# Patient Record
Sex: FEMALE | Race: WHITE | NOT HISPANIC OR LATINO | Employment: FULL TIME | URBAN - METROPOLITAN AREA
[De-identification: names, ages, dates, MRNs, and addresses within clinical notes are randomized per-mention and may not be internally consistent; named-entity substitution may affect disease eponyms.]

---

## 2019-11-05 ENCOUNTER — OFFICE VISIT (OUTPATIENT)
Dept: INTERNAL MEDICINE | Facility: CLINIC | Age: 24
End: 2019-11-05
Payer: COMMERCIAL

## 2019-11-05 VITALS
WEIGHT: 158.5 LBS | SYSTOLIC BLOOD PRESSURE: 100 MMHG | DIASTOLIC BLOOD PRESSURE: 80 MMHG | HEART RATE: 80 BPM | HEIGHT: 65 IN | BODY MASS INDEX: 26.41 KG/M2 | TEMPERATURE: 98 F

## 2019-11-05 DIAGNOSIS — H69.93 EUSTACHIAN TUBE DYSFUNCTION, BILATERAL: Primary | ICD-10-CM

## 2019-11-05 PROCEDURE — 99999 PR PBB SHADOW E&M-NEW PATIENT-LVL III: CPT | Mod: PBBFAC,,, | Performed by: NURSE PRACTITIONER

## 2019-11-05 PROCEDURE — 99214 OFFICE O/P EST MOD 30 MIN: CPT | Mod: S$GLB,,, | Performed by: NURSE PRACTITIONER

## 2019-11-05 PROCEDURE — 99999 PR PBB SHADOW E&M-NEW PATIENT-LVL III: ICD-10-PCS | Mod: PBBFAC,,, | Performed by: NURSE PRACTITIONER

## 2019-11-05 PROCEDURE — 99214 PR OFFICE/OUTPT VISIT, EST, LEVL IV, 30-39 MIN: ICD-10-PCS | Mod: S$GLB,,, | Performed by: NURSE PRACTITIONER

## 2019-11-05 PROCEDURE — 3008F BODY MASS INDEX DOCD: CPT | Mod: CPTII,S$GLB,, | Performed by: NURSE PRACTITIONER

## 2019-11-05 PROCEDURE — 3008F PR BODY MASS INDEX (BMI) DOCUMENTED: ICD-10-PCS | Mod: CPTII,S$GLB,, | Performed by: NURSE PRACTITIONER

## 2019-11-05 RX ORDER — FLUTICASONE PROPIONATE 50 MCG
2 SPRAY, SUSPENSION (ML) NASAL DAILY
Qty: 1 BOTTLE | Refills: 0 | Status: SHIPPED | OUTPATIENT
Start: 2019-11-05 | End: 2019-11-27 | Stop reason: SDUPTHER

## 2019-11-05 RX ORDER — PREDNISONE 20 MG/1
TABLET ORAL
Qty: 20 TABLET | Refills: 0 | Status: SHIPPED | OUTPATIENT
Start: 2019-11-05

## 2019-11-05 RX ORDER — AMOXICILLIN 500 MG/1
500 CAPSULE ORAL
COMMUNITY

## 2019-11-05 NOTE — PATIENT INSTRUCTIONS
Continue augmentin  Start prednisone taper tomorrow  Warm steamy soups and beverages  Zyrtec, sudafed, and flonase daily   Follow up with ent if not improving after steroid course

## 2019-11-05 NOTE — PROGRESS NOTES
"Subjective:       Patient ID: Janett Dunbar is a 23 y.o. female.    Chief Complaint: Otalgia    Patient had Upper Respiratory Infection on 10/26. She flew on an airplane on 10/28. She states that ears plugged up and became painful. She was seen at [facility] clinic and told she had BOM and tonsillitis. She was given augmentin and sudafed. All Upper Respiratory Infection symptoms have improved, however, she has continued plugged feeling to bilateral ears. Her hearing is slightly decreased. She feels that they will not pop back open since the airplane ride. No fever. She has 3 days left of augmentin.      /80   Pulse 80   Temp 98 °F (36.7 °C)   Ht 5' 5" (1.651 m)   Wt 71.9 kg (158 lb 8.2 oz)   BMI 26.38 kg/m²     Review of Systems   Constitutional: Positive for activity change. Negative for appetite change, chills, diaphoresis, fatigue, fever and unexpected weight change.   HENT: Positive for ear pain and hearing loss.    Respiratory: Negative for cough and shortness of breath.    Cardiovascular: Negative for chest pain, palpitations and leg swelling.   Gastrointestinal: Negative.    Genitourinary: Negative.    Musculoskeletal: Negative.    Skin: Negative for color change, pallor, rash and wound.   Allergic/Immunologic: Negative for immunocompromised state.   Neurological: Negative.  Negative for dizziness and facial asymmetry.   Hematological: Negative for adenopathy. Does not bruise/bleed easily.   Psychiatric/Behavioral: Negative for agitation, behavioral problems and confusion.       Objective:      Physical Exam   Constitutional: She is oriented to person, place, and time. She appears well-developed and well-nourished. No distress.   HENT:   Head: Normocephalic and atraumatic.   Right Ear: Tympanic membrane, external ear and ear canal normal.   Left Ear: Tympanic membrane, external ear and ear canal normal.   Nose: Nose normal.   Mouth/Throat: Uvula is midline. No oropharyngeal exudate, posterior " oropharyngeal edema or posterior oropharyngeal erythema.   Eyes: Conjunctivae are normal. Right eye exhibits no discharge. Left eye exhibits no discharge.   Cardiovascular: Normal rate, regular rhythm and normal heart sounds.   No murmur heard.  Pulmonary/Chest: Effort normal and breath sounds normal. No respiratory distress. She has no wheezes.   Abdominal: Soft. She exhibits no distension.   Musculoskeletal: Normal range of motion. She exhibits no edema or tenderness.   Neurological: She is alert and oriented to person, place, and time.   Skin: Skin is warm and dry. No rash noted. She is not diaphoretic. No erythema.   Psychiatric: She has a normal mood and affect. Her behavior is normal. Judgment and thought content normal.   Nursing note and vitals reviewed.      Assessment:       1. Eustachian tube dysfunction, bilateral        Plan:       Janett was seen today for otalgia.    Diagnoses and all orders for this visit:    Eustachian tube dysfunction, bilateral  -     predniSONE (DELTASONE) 20 MG tablet; 3 pills for 3 days, 2 pills for 3 days, 1 pill for 3 days, 1/2 pill for 3 days then stop.  -     fluticasone propionate (FLONASE) 50 mcg/actuation nasal spray; 2 sprays (100 mcg total) by Each Nostril route once daily.      Patient Instructions   Continue augmentin  Start prednisone taper tomorrow  Warm steamy soups and beverages  Zyrtec, sudafed, and flonase daily   Follow up with ent if not improving after steroid course

## 2019-11-27 DIAGNOSIS — H69.93 EUSTACHIAN TUBE DYSFUNCTION, BILATERAL: ICD-10-CM

## 2019-11-29 RX ORDER — FLUTICASONE PROPIONATE 50 MCG
2 SPRAY, SUSPENSION (ML) NASAL DAILY
Qty: 16 ML | Refills: 0 | Status: SHIPPED | OUTPATIENT
Start: 2019-11-29

## 2020-09-25 ENCOUNTER — PATIENT MESSAGE (OUTPATIENT)
Dept: ADMINISTRATIVE | Facility: OTHER | Age: 25
End: 2020-09-25

## 2020-09-25 ENCOUNTER — PATIENT MESSAGE (OUTPATIENT)
Dept: OTHER | Facility: OTHER | Age: 25
End: 2020-09-25

## 2020-09-28 DIAGNOSIS — Z13.79 GENETIC SCREENING: ICD-10-CM

## 2021-04-29 ENCOUNTER — PATIENT MESSAGE (OUTPATIENT)
Dept: RESEARCH | Facility: HOSPITAL | Age: 26
End: 2021-04-29

## 2021-11-16 PROBLEM — O26.899 RH NEGATIVE STATE IN ANTEPARTUM PERIOD (HCC): Status: ACTIVE | Noted: 2021-11-16

## 2021-11-16 PROBLEM — Z67.91 RH NEGATIVE STATE IN ANTEPARTUM PERIOD (HCC): Status: ACTIVE | Noted: 2021-11-16

## 2021-11-16 PROBLEM — Z67.91 RH NEGATIVE STATE IN ANTEPARTUM PERIOD: Status: ACTIVE | Noted: 2021-11-16

## 2021-11-16 PROBLEM — O26.899 RH NEGATIVE STATE IN ANTEPARTUM PERIOD: Status: ACTIVE | Noted: 2021-11-16

## 2021-12-17 PROBLEM — Z34.01 ENCOUNTER FOR SUPERVISION OF NORMAL FIRST PREGNANCY IN FIRST TRIMESTER (HCC): Status: ACTIVE | Noted: 2021-12-17

## 2021-12-17 PROBLEM — Z34.01 ENCOUNTER FOR SUPERVISION OF NORMAL FIRST PREGNANCY IN FIRST TRIMESTER: Status: ACTIVE | Noted: 2021-12-17

## 2022-06-11 LAB
STREPTOCOCCUS GROUP B PCR: NEGATIVE
STREPTOCOCCUS GROUP B PCR: NEGATIVE

## 2022-07-04 ENCOUNTER — ANESTHESIA (OUTPATIENT)
Dept: OBGYN UNIT | Facility: HOSPITAL | Age: 27
End: 2022-07-04
Payer: COMMERCIAL

## 2022-07-04 ENCOUNTER — HOSPITAL ENCOUNTER (INPATIENT)
Facility: HOSPITAL | Age: 27
LOS: 2 days | Discharge: HOME | End: 2022-07-06
Attending: OBSTETRICS & GYNECOLOGY | Admitting: OBSTETRICS & GYNECOLOGY
Payer: COMMERCIAL

## 2022-07-04 ENCOUNTER — ANESTHESIA EVENT (OUTPATIENT)
Dept: OBGYN UNIT | Facility: HOSPITAL | Age: 27
End: 2022-07-04
Payer: COMMERCIAL

## 2022-07-04 PROBLEM — Z34.90 PREGNANCY (HCC): Status: ACTIVE | Noted: 2022-07-04

## 2022-07-04 PROBLEM — Z34.90 PREGNANCY: Status: ACTIVE | Noted: 2022-07-04

## 2022-07-04 LAB
ANTIBODY SCREEN: NEGATIVE
BASOPHILS # BLD AUTO: 0.04 X10(3) UL (ref 0–0.2)
BASOPHILS NFR BLD AUTO: 0.3 %
EOSINOPHIL # BLD AUTO: 0.1 X10(3) UL (ref 0–0.7)
EOSINOPHIL NFR BLD AUTO: 0.8 %
ERYTHROCYTE [DISTWIDTH] IN BLOOD BY AUTOMATED COUNT: 13.3 %
HCT VFR BLD AUTO: 38.1 %
HGB BLD-MCNC: 12.6 G/DL
IMM GRANULOCYTES # BLD AUTO: 0.07 X10(3) UL (ref 0–1)
IMM GRANULOCYTES NFR BLD: 0.6 %
LYMPHOCYTES # BLD AUTO: 1.91 X10(3) UL (ref 1–4)
LYMPHOCYTES NFR BLD AUTO: 15.4 %
MCH RBC QN AUTO: 29.3 PG (ref 26–34)
MCHC RBC AUTO-ENTMCNC: 33.1 G/DL (ref 31–37)
MCV RBC AUTO: 88.6 FL
MONOCYTES # BLD AUTO: 1.19 X10(3) UL (ref 0.1–1)
MONOCYTES NFR BLD AUTO: 9.6 %
NEUTROPHILS # BLD AUTO: 9.1 X10 (3) UL (ref 1.5–7.7)
NEUTROPHILS # BLD AUTO: 9.1 X10(3) UL (ref 1.5–7.7)
NEUTROPHILS NFR BLD AUTO: 73.3 %
PLATELET # BLD AUTO: 240 10(3)UL (ref 150–450)
RBC # BLD AUTO: 4.3 X10(6)UL
RH BLOOD TYPE: NEGATIVE
SARS-COV-2 RNA RESP QL NAA+PROBE: NOT DETECTED
T PALLIDUM AB SER QL IA: NONREACTIVE
WBC # BLD AUTO: 12.4 X10(3) UL (ref 4–11)

## 2022-07-04 PROCEDURE — 86901 BLOOD TYPING SEROLOGIC RH(D): CPT | Performed by: OBSTETRICS & GYNECOLOGY

## 2022-07-04 PROCEDURE — 86780 TREPONEMA PALLIDUM: CPT | Performed by: OBSTETRICS & GYNECOLOGY

## 2022-07-04 PROCEDURE — 86850 RBC ANTIBODY SCREEN: CPT | Performed by: OBSTETRICS & GYNECOLOGY

## 2022-07-04 PROCEDURE — 99204 OFFICE O/P NEW MOD 45 MIN: CPT

## 2022-07-04 PROCEDURE — 86900 BLOOD TYPING SEROLOGIC ABO: CPT | Performed by: OBSTETRICS & GYNECOLOGY

## 2022-07-04 PROCEDURE — 85025 COMPLETE CBC W/AUTO DIFF WBC: CPT | Performed by: OBSTETRICS & GYNECOLOGY

## 2022-07-04 RX ORDER — NALBUPHINE HCL 10 MG/ML
2.5 AMPUL (ML) INJECTION
Status: DISCONTINUED | OUTPATIENT
Start: 2022-07-04 | End: 2022-07-05

## 2022-07-04 RX ORDER — TERBUTALINE SULFATE 1 MG/ML
0.25 INJECTION, SOLUTION SUBCUTANEOUS AS NEEDED
Status: DISCONTINUED | OUTPATIENT
Start: 2022-07-04 | End: 2022-07-05

## 2022-07-04 RX ORDER — BUPIVACAINE HCL/0.9 % NACL/PF 0.25 %
5 PLASTIC BAG, INJECTION (ML) EPIDURAL AS NEEDED
Status: DISCONTINUED | OUTPATIENT
Start: 2022-07-04 | End: 2022-07-05

## 2022-07-04 RX ORDER — ONDANSETRON 2 MG/ML
4 INJECTION INTRAMUSCULAR; INTRAVENOUS EVERY 6 HOURS PRN
Status: DISCONTINUED | OUTPATIENT
Start: 2022-07-04 | End: 2022-07-05

## 2022-07-04 RX ORDER — SODIUM CHLORIDE, SODIUM LACTATE, POTASSIUM CHLORIDE, CALCIUM CHLORIDE 600; 310; 30; 20 MG/100ML; MG/100ML; MG/100ML; MG/100ML
INJECTION, SOLUTION INTRAVENOUS CONTINUOUS
Status: DISCONTINUED | OUTPATIENT
Start: 2022-07-04 | End: 2022-07-05

## 2022-07-04 RX ORDER — TRISODIUM CITRATE DIHYDRATE AND CITRIC ACID MONOHYDRATE 500; 334 MG/5ML; MG/5ML
30 SOLUTION ORAL AS NEEDED
Status: DISCONTINUED | OUTPATIENT
Start: 2022-07-04 | End: 2022-07-05

## 2022-07-04 RX ORDER — DEXTROSE, SODIUM CHLORIDE, SODIUM LACTATE, POTASSIUM CHLORIDE, AND CALCIUM CHLORIDE 5; .6; .31; .03; .02 G/100ML; G/100ML; G/100ML; G/100ML; G/100ML
INJECTION, SOLUTION INTRAVENOUS AS NEEDED
Status: DISCONTINUED | OUTPATIENT
Start: 2022-07-04 | End: 2022-07-05

## 2022-07-04 RX ORDER — AMMONIA INHALANTS 0.04 G/.3ML
0.3 INHALANT RESPIRATORY (INHALATION) AS NEEDED
Status: DISCONTINUED | OUTPATIENT
Start: 2022-07-04 | End: 2022-07-05

## 2022-07-04 RX ORDER — IBUPROFEN 600 MG/1
600 TABLET ORAL EVERY 6 HOURS PRN
Status: DISCONTINUED | OUTPATIENT
Start: 2022-07-04 | End: 2022-07-05

## 2022-07-04 RX ORDER — ACETAMINOPHEN 500 MG
500 TABLET ORAL EVERY 6 HOURS PRN
Status: DISCONTINUED | OUTPATIENT
Start: 2022-07-04 | End: 2022-07-05

## 2022-07-05 LAB — FETAL SCREEN RESULT: NEGATIVE

## 2022-07-05 PROCEDURE — 85461 HEMOGLOBIN FETAL: CPT | Performed by: OBSTETRICS & GYNECOLOGY

## 2022-07-05 PROCEDURE — 0KQM0ZZ REPAIR PERINEUM MUSCLE, OPEN APPROACH: ICD-10-PCS | Performed by: OBSTETRICS & GYNECOLOGY

## 2022-07-05 PROCEDURE — 3E0334Z INTRODUCTION OF SERUM, TOXOID AND VACCINE INTO PERIPHERAL VEIN, PERCUTANEOUS APPROACH: ICD-10-PCS | Performed by: OBSTETRICS & GYNECOLOGY

## 2022-07-05 RX ORDER — IBUPROFEN 600 MG/1
600 TABLET ORAL EVERY 6 HOURS
Status: DISCONTINUED | OUTPATIENT
Start: 2022-07-05 | End: 2022-07-06

## 2022-07-05 RX ORDER — IBUPROFEN 600 MG/1
600 TABLET ORAL EVERY 6 HOURS
Status: DISCONTINUED | OUTPATIENT
Start: 2022-07-05 | End: 2022-07-05

## 2022-07-05 RX ORDER — DOCUSATE SODIUM 100 MG/1
100 CAPSULE, LIQUID FILLED ORAL
Status: DISCONTINUED | OUTPATIENT
Start: 2022-07-05 | End: 2022-07-06

## 2022-07-05 RX ORDER — ACETAMINOPHEN 500 MG
500 TABLET ORAL EVERY 6 HOURS PRN
Status: DISCONTINUED | OUTPATIENT
Start: 2022-07-05 | End: 2022-07-06

## 2022-07-05 RX ORDER — BISACODYL 10 MG
10 SUPPOSITORY, RECTAL RECTAL ONCE AS NEEDED
Status: DISCONTINUED | OUTPATIENT
Start: 2022-07-05 | End: 2022-07-06

## 2022-07-05 RX ORDER — ACETAMINOPHEN 500 MG
1000 TABLET ORAL EVERY 6 HOURS PRN
Status: DISCONTINUED | OUTPATIENT
Start: 2022-07-05 | End: 2022-07-06

## 2022-07-05 RX ORDER — SIMETHICONE 80 MG
80 TABLET,CHEWABLE ORAL 3 TIMES DAILY PRN
Status: DISCONTINUED | OUTPATIENT
Start: 2022-07-05 | End: 2022-07-06

## 2022-07-05 NOTE — PLAN OF CARE
Problem: BIRTH - VAGINAL/ SECTION  Goal: Fetal and maternal status remain reassuring during the birth process  Description: INTERVENTIONS:  - Monitor vital signs  - Monitor fetal heart rate  - Monitor uterine activity  - Monitor labor progression (vaginal delivery)  - DVT prophylaxis (C/S delivery)  - Surgical antibiotic prophylaxis (C/S delivery)  Outcome: Progressing     Problem: PAIN - ADULT  Goal: Verbalizes/displays adequate comfort level or patient's stated pain goal  Description: INTERVENTIONS:  - Encourage pt to monitor pain and request assistance  - Assess pain using appropriate pain scale  - Administer analgesics based on type and severity of pain and evaluate response  - Implement non-pharmacological measures as appropriate and evaluate response  - Consider cultural and social influences on pain and pain management  - Manage/alleviate anxiety  - Utilize distraction and/or relaxation techniques  - Monitor for opioid side effects  - Notify MD/LIP if interventions unsuccessful or patient reports new pain  - Anticipate increased pain with activity and pre-medicate as appropriate  Outcome: Progressing     Problem: ANXIETY  Goal: Will report anxiety at manageable levels  Description: INTERVENTIONS:  - Administer medication as ordered  - Teach and rehearse alternative coping skills  - Provide emotional support with 1:1 interaction with staff  Outcome: Progressing     Problem: Patient/Family Goals  Goal: Patient/Family Long Term Goal  Description: Patient's Long Term Goal: Uncomplicated Delivery Process    Interventions:  -   - See additional Care Plan goals for specific interventions  Outcome: Progressing  Goal: Patient/Family Short Term Goal  Description: Patient's Short Term Goal: Adequate Pain Control    Interventions:   -   - See additional Care Plan goals for specific interventions  Outcome: Progressing

## 2022-07-05 NOTE — ANESTHESIA PROCEDURE NOTES
Labor Analgesia  Performed by: Mindy Hinojosa MD  Authorized by: Mindy Hinojosa MD       General Information and Staff    Start Time:  7/4/2022 11:28 PM  End Time:  7/5/2022 11:28 PM  Anesthesiologist:  Mindy Hinojosa MD  Performed by:   Anesthesiologist  Patient Location:  OB  Site Identification: surface landmarks    Reason for Block: labor epidural    Preanesthetic Checklist: patient identified, IV checked, risks and benefits discussed, monitors and equipment checked, pre-op evaluation, timeout performed, IV bolus, anesthesia consent and sterile technique used      Procedure Details    Patient Position:  Sitting  Prep: ChloraPrep    Monitoring:  Heart rate and continuous pulse ox  Approach:  Midline    Epidural Needle    Injection Technique:  SUDHA saline  Needle Type:  Tuohy  Needle Gauge:  17 G  Needle Length:  3.375 in  Needle Insertion Depth:  5    Spinal Needle      Catheter    Catheter Type:  End hole  Catheter Size:  19 G  Catheter at Skin Depth:  9  Test Dose:  Negative    Assessment      Additional Comments     Test Dose Given at 2446  Fentanyl 2 mc/ml + Ropivicaine 0.15% epidural bolus 5 mL  Fentanyl 2 mc/ml + Ropivicaine 0.15% epidural infusion 12cc/hr  Fentanyl 50 mcg/mL 100 mcg

## 2022-07-05 NOTE — L&D DELIVERY NOTE
Lluvia Sanders, Girl [[id number]]    Labor Events     labor?: No   steroids?: None  Antibiotics received during labor?: No  Rupture date/time: 2022 2330     Rupture type: SROM  Fluid color: Bright red  Fluid color comment: Blood tinged  Induction: None     Labor Event Times    Labor onset date/time: 2022  Dilation complete date/time: 2022 0130     [place] Presentation    Presentation: Vertex     Operative Delivery    Operative Vaginal Delivery: N/A            Shoulder Dystocia    Shoulder Dystocia: N/A     Anesthesia    Method: Epidural           Delivery    Delivery date/time:  22 02:43:00   Delivery type: Normal spontaneous vaginal delivery    Details:     Delivery location: delivery room     Delivery Providers    Delivering Clinician: Chelsey Anderson MD   Delivery personnel:  Provider Role   Jose Moya, RN Baby Nurse   Lexy Hoyos, DIXIE Delivery Nurse         Cord    Vessels: 3 Vessels  Complications: Nuchal  # of loops: 1  Timed cord clamping: Yes  Time in sec: 30  Cord blood disposition: to lab  Gases sent?: No     Resuscitation    Method: None     [place] Measurements    Weight: 2660 g 5 lb 13.8 oz Length: 19.5 cm   Head circum.: 33 cm Chest circum.: 31 cm      Abdominal circum.: 29.5 cm       Placenta    Date/time: 2022 0246  Removal: Spontaneous  Appearance: Intact     Apgars    Living status: Living   Apgar Scoring Key:    0 1 2    Skin color Blue or pale Acrocyanotic Completely pink    Heart rate Absent <100 bpm >100 bpm    Reflex irritability No response Grimace Cry or active withdrawal    Muscle tone Limp Some flexion Active motion    Respiratory effort Absent Weak cry; hypoventilation Good, crying              1 Minute:  5 Minute:  10 Minute:  15 Minute:  20 Minute:    Skin color: 1  1       Heart rate: 2  2       Reflex irritablity: 2  2       Muscle tone: 2  2       Respiratory effort: 2  2       Total: 9  9          Apgars assigned by: LAUREN PRITCHETT  [place] disposition: with mother     Skin to Skin    Skin to skin initiated date/time: 2022 0246  Skin to skin with: Mother     Vaginal Count    Initial count RN: Ulysses Brito RN  Initial count Tech: Malgorzatajackjaz Peraza   Jean Claude   Sharps    Initial counts 11   0    Final counts           Delivery (Maternal)    Episiotomy: None            Delivery Note    Date: 22    Preop diagnosis: IUP at term    Postop diagnosis: same    Procedure:     Attending: Dr. Radha Mcgarry    Findings: viable female infant, weighing 5 lbs 14 oz, APGARs 9,9. Placenta delivered spontaneously and intact. Infant bulb suctioned at perineum. Lacerations: second degree repaired with 2-0 vicryl rapide using routine technique.     EBL: 305 ml    Complications: none    Condition: stable    Erinn Macias MD  2022  2:59 AM

## 2022-07-05 NOTE — PROGRESS NOTES
Patient up to bathroom with assist x 1. Unable to void at this time. Patient transferred to mother/baby room 1118 per wheelchair in stable condition with baby and personal belongings. Accompanied by significant other and staff. Report given to mother/baby RN.

## 2022-07-05 NOTE — PROGRESS NOTES
Pt is a 27year old female admitted to TRG1/TRG1-A. Patient presents with:  R/o Labor: Patient here with c/o contractions every 3-4 minutes X 90 minutes. She states she has been kehinde since early this morning, but they are more painful and regular in the last hour and a half. She denies leaking of fluid or vaginal bleeding. +FM. Pt is  39w4d intra-uterine pregnancy. History obtained. Oriented to room, staff, and plan of care.

## 2022-07-06 VITALS
HEIGHT: 64.02 IN | BODY MASS INDEX: 36.02 KG/M2 | WEIGHT: 211 LBS | OXYGEN SATURATION: 98 % | DIASTOLIC BLOOD PRESSURE: 62 MMHG | TEMPERATURE: 98 F | SYSTOLIC BLOOD PRESSURE: 111 MMHG | HEART RATE: 59 BPM | RESPIRATION RATE: 16 BRPM

## 2022-07-06 LAB
BASOPHILS # BLD AUTO: 0.06 X10(3) UL (ref 0–0.2)
BASOPHILS NFR BLD AUTO: 0.5 %
EOSINOPHIL # BLD AUTO: 0.19 X10(3) UL (ref 0–0.7)
EOSINOPHIL NFR BLD AUTO: 1.7 %
ERYTHROCYTE [DISTWIDTH] IN BLOOD BY AUTOMATED COUNT: 13.7 %
HCT VFR BLD AUTO: 36.2 %
HGB BLD-MCNC: 11.8 G/DL
IMM GRANULOCYTES # BLD AUTO: 0.08 X10(3) UL (ref 0–1)
IMM GRANULOCYTES NFR BLD: 0.7 %
LYMPHOCYTES # BLD AUTO: 2.13 X10(3) UL (ref 1–4)
LYMPHOCYTES NFR BLD AUTO: 18.5 %
MCH RBC QN AUTO: 29.5 PG (ref 26–34)
MCHC RBC AUTO-ENTMCNC: 32.6 G/DL (ref 31–37)
MCV RBC AUTO: 90.5 FL
MONOCYTES # BLD AUTO: 1.06 X10(3) UL (ref 0.1–1)
MONOCYTES NFR BLD AUTO: 9.2 %
NEUTROPHILS # BLD AUTO: 7.97 X10 (3) UL (ref 1.5–7.7)
NEUTROPHILS # BLD AUTO: 7.97 X10(3) UL (ref 1.5–7.7)
NEUTROPHILS NFR BLD AUTO: 69.4 %
PLATELET # BLD AUTO: 204 10(3)UL (ref 150–450)
RBC # BLD AUTO: 4 X10(6)UL
WBC # BLD AUTO: 11.5 X10(3) UL (ref 4–11)

## 2022-07-06 PROCEDURE — 85025 COMPLETE CBC W/AUTO DIFF WBC: CPT | Performed by: OBSTETRICS & GYNECOLOGY

## 2022-07-06 NOTE — PLAN OF CARE
Problem: POSTPARTUM  Goal: Long Term Goal:Experiences normal postpartum course  Description: INTERVENTIONS:  - Assess and monitor vital signs and lab values. - Assess fundus and lochia. - Provide ice/sitz baths for perineum discomfort. - Monitor healing of incision/episiotomy/laceration, and assess for signs and symptoms of infection and hematoma. - Assess bladder function and monitor for bladder distention.  - Provide/instruct/assist with pericare as needed. - Provide VTE prophylaxis as needed. - Monitor bowel function.  - Encourage ambulation and provide assistance as needed. - Assess and monitor emotional status and provide social service/psych resources as needed. - Utilize standard precautions and use personal protective equipment as indicated. Ensure aseptic care of all intravenous lines and invasive tubes/drains.  - Obtain immunization and exposure to communicable diseases history. 7/6/2022 1708 by Alicia Apple RN  Outcome: Completed  7/6/2022 1304 by Alicia Apple RN  Outcome: Progressing  Goal: Optimize infant feeding at the breast  Description: INTERVENTIONS:  - Initiate breast feeding within first hour after birth. - Monitor effectiveness of current breast feeding efforts. - Assess support systems available to mother/family.  - Identify cultural beliefs/practices regarding lactation, letdown techniques, maternal food preferences. - Assess mother's knowledge and previous experience with breast feeding.  - Provide information as needed about early infant feeding cues (e.g., rooting, lip smacking, sucking fingers/hand) versus late cue of crying.  - Discuss/demonstrate breast feeding aids (e.g., infant sling, nursing footstool/pillows, and breast pumps). - Encourage mother/other family members to express feelings/concerns, and actively listen. - Educate father/SO about benefits of breast feeding and how to manage common lactation challenges.   - Recommend avoidance of specific medications or substances incompatible with breast feeding.  - Assess and monitor for signs of nipple pain/trauma. - Instruct and provide assistance with proper latch. - Review techniques for milk expression (breast pumping) and storage of breast milk. Provide pumping equipment/supplies, instructions and assistance, as needed. - Encourage rooming-in and breast feeding on demand.  - Encourage skin-to-skin contact. - Provide  support as needed. - Assess for and manage engorgement. - Provide breast feeding education handouts and information on community breast feeding support. 2022 by Key Reynoso RN  Outcome: Completed  2022 130 by Key Reynoso RN  Outcome: Progressing  Goal: Appropriate maternal -  bonding  Description: INTERVENTIONS:  - Assess caregiver- interactions. - Assess caregiver's emotional status and coping mechanisms. - Encourage caregiver to participate in  daily care. - Assess support systems available to mother/family.  - Provide /case management support as needed.   2022 by Key Reynoso RN  Outcome: Completed  2022 by Key Reynoso RN  Outcome: Progressing

## 2022-07-06 NOTE — PLAN OF CARE
Problem: BIRTH - VAGINAL/ SECTION  Goal: Fetal and maternal status remain reassuring during the birth process  Description: INTERVENTIONS:  - Monitor vital signs  - Monitor fetal heart rate  - Monitor uterine activity  - Monitor labor progression (vaginal delivery)  - DVT prophylaxis (C/S delivery)  - Surgical antibiotic prophylaxis (C/S delivery)  Outcome: Completed     Problem: PAIN - ADULT  Goal: Verbalizes/displays adequate comfort level or patient's stated pain goal  Description: INTERVENTIONS:  - Encourage pt to monitor pain and request assistance  - Assess pain using appropriate pain scale  - Administer analgesics based on type and severity of pain and evaluate response  - Implement non-pharmacological measures as appropriate and evaluate response  - Consider cultural and social influences on pain and pain management  - Manage/alleviate anxiety  - Utilize distraction and/or relaxation techniques  - Monitor for opioid side effects  - Notify MD/LIP if interventions unsuccessful or patient reports new pain  - Anticipate increased pain with activity and pre-medicate as appropriate  Outcome: Completed     Problem: ANXIETY  Goal: Will report anxiety at manageable levels  Description: INTERVENTIONS:  - Administer medication as ordered  - Teach and rehearse alternative coping skills  - Provide emotional support with 1:1 interaction with staff  Outcome: Completed     Problem: Patient/Family Goals  Goal: Patient/Family Long Term Goal  Description: Patient's Long Term Goal: Uncomplicated Delivery Process    Interventions:  -   - See additional Care Plan goals for specific interventions  Outcome: Completed  Goal: Patient/Family Short Term Goal  Description: Patient's Short Term Goal: Adequate Pain Control    Interventions:   -   - See additional Care Plan goals for specific interventions  Outcome: Completed     Problem: POSTPARTUM  Goal: Long Term Goal:Experiences normal postpartum course  Description: INTERVENTIONS:  - Assess and monitor vital signs and lab values. - Assess fundus and lochia. - Provide ice/sitz baths for perineum discomfort. - Monitor healing of incision/episiotomy/laceration, and assess for signs and symptoms of infection and hematoma. - Assess bladder function and monitor for bladder distention.  - Provide/instruct/assist with pericare as needed. - Provide VTE prophylaxis as needed. - Monitor bowel function.  - Encourage ambulation and provide assistance as needed. - Assess and monitor emotional status and provide social service/psych resources as needed. - Utilize standard precautions and use personal protective equipment as indicated. Ensure aseptic care of all intravenous lines and invasive tubes/drains.  - Obtain immunization and exposure to communicable diseases history. Outcome: Progressing  Goal: Optimize infant feeding at the breast  Description: INTERVENTIONS:  - Initiate breast feeding within first hour after birth. - Monitor effectiveness of current breast feeding efforts. - Assess support systems available to mother/family.  - Identify cultural beliefs/practices regarding lactation, letdown techniques, maternal food preferences. - Assess mother's knowledge and previous experience with breast feeding.  - Provide information as needed about early infant feeding cues (e.g., rooting, lip smacking, sucking fingers/hand) versus late cue of crying.  - Discuss/demonstrate breast feeding aids (e.g., infant sling, nursing footstool/pillows, and breast pumps). - Encourage mother/other family members to express feelings/concerns, and actively listen. - Educate father/SO about benefits of breast feeding and how to manage common lactation challenges. - Recommend avoidance of specific medications or substances incompatible with breast feeding.  - Assess and monitor for signs of nipple pain/trauma. - Instruct and provide assistance with proper latch.   - Review techniques for milk expression (breast pumping) and storage of breast milk. Provide pumping equipment/supplies, instructions and assistance, as needed. - Encourage rooming-in and breast feeding on demand.  - Encourage skin-to-skin contact. - Provide LC support as needed. - Assess for and manage engorgement. - Provide breast feeding education handouts and information on community breast feeding support. Outcome: Progressing  Goal: Appropriate maternal -  bonding  Description: INTERVENTIONS:  - Assess caregiver- interactions. - Assess caregiver's emotional status and coping mechanisms. - Encourage caregiver to participate in  daily care. - Assess support systems available to mother/family.  - Provide /case management support as needed. Outcome: Progressing     Problem: SAFETY ADULT - FALL  Goal: Free from fall injury  Description: INTERVENTIONS:  - Assess pt frequently for physical needs  - Identify cognitive and physical deficits and behaviors that affect risk of falls.   - [place] fall precautions as indicated by assessment.  - Educate pt/family on patient safety including physical limitations  - Instruct pt to call for assistance with activity based on assessment  - Modify environment to reduce risk of injury  - Provide assistive devices as appropriate  - Consider OT/PT consult to assist with strengthening/mobility  - Encourage toileting schedule  Outcome: Completed

## 2022-07-10 ENCOUNTER — TELEPHONE (OUTPATIENT)
Dept: OBGYN UNIT | Facility: HOSPITAL | Age: 27
End: 2022-07-10

## 2025-06-09 ENCOUNTER — TELEPHONE (OUTPATIENT)
Dept: OBGYN UNIT | Facility: HOSPITAL | Age: 30
End: 2025-06-09

## 2025-06-14 NOTE — PROGRESS NOTES
Pt is a 29 year old female admitted to TRG5/TRG5-A.     Chief Complaint   Patient presents with    Decreased Fetal Movement     Noticed baby was not moving as much starting about 1600, tried eating, laying on her side to feel for movement, and then going for a walk to try and get the baby moving like usual. Started to feel hiccups as she got to triage       Pt is  40w2d intra-uterine pregnancy.  History obtained, consents signed. Oriented to room, staff, and plan of care.

## 2025-06-14 NOTE — PROGRESS NOTES
Pt to be discharged home, feeling fetal movement and the NST is reactive. Return precautions reviewed, IOL scheduled 6/16 in the morning.

## 2025-06-14 NOTE — NST
Nonstress Test   Patient: Monica Nair    Gestation: 40w2d    NST:       Variability: Moderate           Accelerations: Yes           Decelerations: None            Baseline: 155 BPM           Uterine Irritability: No           Contractions: Irregular                    Contraction Frequency: X3                   Acoustic Stimulator: No           Nonstress Test Interpretation: Reactive                      FHR Category: Category I          Additional Comments      Physician Evaluation      NST Interpretation: Reactive    Disposition:   Discharged    Comments:        Madina Bloom MD

## 2025-06-16 NOTE — PLAN OF CARE
Problem: BIRTH - VAGINAL/ SECTION  Goal: Fetal and maternal status remain reassuring during the birth process  Description: INTERVENTIONS:  - Monitor vital signs  - Monitor fetal heart rate  - Monitor uterine activity  - Monitor labor progression (vaginal delivery)  - DVT prophylaxis (C/S delivery)  - Surgical antibiotic prophylaxis (C/S delivery)  Outcome: Progressing     Problem: PAIN - ADULT  Goal: Verbalizes/displays adequate comfort level or patient's stated pain goal  Description: INTERVENTIONS:  - Encourage pt to monitor pain and request assistance  - Assess pain using appropriate pain scale  - Administer analgesics based on type and severity of pain and evaluate response  - Implement non-pharmacological measures as appropriate and evaluate response  - Consider cultural and social influences on pain and pain management  - Manage/alleviate anxiety  - Utilize distraction and/or relaxation techniques  - Monitor for opioid side effects  - Notify MD/LIP if interventions unsuccessful or patient reports new pain  - Anticipate increased pain with activity and pre-medicate as appropriate  Outcome: Progressing     Problem: ANXIETY  Goal: Will report anxiety at manageable levels  Description: INTERVENTIONS:  - Administer medication as ordered  - Teach and rehearse alternative coping skills  - Provide emotional support with 1:1 interaction with staff  Outcome: Progressing     Problem: Patient/Family Goals  Goal: Patient/Family Long Term Goal  Description: Patient's Long Term Goal: to have adequate pain management     Interventions:  -   - See additional Care Plan goals for specific interventions  Outcome: Progressing  Goal: Patient/Family Short Term Goal  Description: Patient's Short Term Goal: to have an uncomplicated vaginal delivery   Interventions:   -   - See additional Care Plan goals for specific interventions  Outcome: Progressing

## 2025-06-16 NOTE — L&D DELIVERY NOTE
Sebastien Nair [[id number]]      Labor Events     labor?: No   steroids?: None  Antibiotics received during labor?: No  Rupture date/time: 2025 1015     Rupture type: AROM  Fluid color: Clear  Labor type: Induced Onset of Labor  Induction: Oxytocin, AROM  Indications for induction: Post-term Gestation  Intrapartum & labor complications: None       Labor Event Times    Labor onset date/time: 2025 1015  Dilation complete date/time: 2025 1321  Start pushing date/time: 2025 13:25       York Presentation    Presentation: Vertex  Position: Occiput Anterior       Operative Delivery    Operative Vaginal Delivery: No                Shoulder Dystocia    Shoulder Dystocia: Yes  Maneuver performed: Seema           Anesthesia    Method: Epidural               Delivery      Head delivery date/time: 2025 13:36:15   Delivery date/time:  25 13:36:43   Delivery type: Normal spontaneous vaginal delivery    Details:     Delivery location: delivery room       Delivery Providers    Delivering Clinician: Jona Allen DO   Delivery personnel:  Provider Role   Elaine Draper, RN Baby Nurse   Kristin Diamond RN Delivery Nurse             Cord    Vessels: 3 Vessels  Complications: None  Timed cord clamping: Yes  Time in sec: 30  Cord blood disposition: to lab  Gases sent?: No       Resuscitation    Method: None       York Measurements      Weight: 3490 g 7 lb 11.1 oz Length: 49.5 cm     Head circum.: 35 cm Chest circum.: 32 cm      Abdominal circum.: 30 cm           Placenta    Date/time: 2025 13:40  Removal: Spontaneous  Appearance: Intact  Disposition: held for future pathology       Apgars    Living status: Living   Apgar Scoring Key:    0 1 2    Skin color Blue or pale Acrocyanotic Completely pink    Heart rate Absent <100 bpm >100 bpm    Reflex irritability No response Grimace Cry or active withdrawal    Muscle tone Limp Some flexion Active motion     Respiratory effort Absent Weak cry; hypoventilation Good, crying              1 Minute:  5 Minute:  10 Minute:  15 Minute:  20 Minute:      Skin color: 0  1       Heart rate: 2  2       Reflex irritablity: 2  2       Muscle tone: 2  2       Respiratory effort: 2  2       Total: 8  9          Apgars assigned by: JANETH BARBOSA RN   disposition: with mother       Skin to Skin    Skin to skin initiated date/time: 2025 1356  Skin to skin with: Mother       Vaginal Count    Initial count RN: Kristin Diamond RN  Initial count Tech: Danie, Diann R   Sponges   Sharps    Initial counts 11   0    Final counts 11   11    Final count RN: Kristin Diamond RN  Final count MD: Jona Allen DO       Lacerations    Episiotomy: None  Perineal lacerations: 2nd Repaired?: Yes             [facility]   part of formerly [facility]    Vaginal Delivery Note    Monica Moralestovabrian Joanie Patient Status:  Inpatient    1995 MRN [id number]   Location [facility] LABOR & DELIVERY Attending Jona Allen DO   Hosp Day # 0 PCP Joann Ace MD     Delivery     Diagnosis: spontaneous vaginal delivery    Labor Details: Induction    Procedure: spontaneous vaginal delivery    Neonatologist Present: no    Placenta  Date/Time of Delivery: 2025  1:40 PM   Delivery: spontaneous  Placenta to Pathology: no  Cord Gases Submitted: no    Cord Complications: none    Maternal Anesthesia: epidural   Episiotomy/Laceration Repair  Laceration: perineal 2nd degree    Sponge and Needle Counts:  Verified yes    Delivery Complications  Onset of Second Stage: 1:21 PM  Time of Head Delivered: 2025 13:36:15  Time of Body Delivered: 2025 13:36:43    Operative Vaginal Delivery:   No    Shoulder Dystocia:  Maneuvers attempted below with order. Duration: 28 seconds.    Any/All maneuvers that apply and the order in which they were utilized. If the Maneuver was not used, it will not be checked and/or marked as N/A in  the order selection. The order is not specified by the standard of care:     Maneuvers utilized In which Order   [x]Seema 1   []Suprapubic pressure N/A   []Episiotomy N/A   []Episiotomy extension N/A   []Posterior arm release N/A   []Adam's Maneuver (corkscrew from behind post shoulder) N/A   []Woods Maneuver (corkscrew from in front of posterior shoulder) N/A   []Other (list)        N/A     Verify that fundal pressure was not applied after the head delivered:  [x] Not Applied  [] Applied    Infant:    Infant Sex/Birthweight: male 7 lb 11.1 oz (3.49 kg)  Presentation Vertex [1]  Position   Occiput [1] Anterior [1]    Hemorrhage?: No, patient is stable, asymptomatic and blood loss is within expected amount following delivery. Standard treatment provided to prevent PPH. Patient does not meet ACOG criteria for hemorrhage at this time.    QBL: 40 Quantitative Blood Loss (mL)       Delivery Comments:                                                          Labor Course: Monica Nair is a 29 year old  who presented to L&D at 40w5d for IOL due to pEDD.  Pregnancy complicated by obesity, hx of SGA infant, Rh negative status. For her induction course she received pitocin and underwent amniotomy.      Procedure:  The patient was placed in the dorsal lithotomy position and prepped in the usual fashion.  She was encouraged to push.  The head delivered in the ASHISH position.  Nuchal cord not present.  The shoulders did not immediately delivered.  A shoulder dystocia was identified.  The patient wasp placed in McRobert's and the anterior shoulder then easily delivery.  The body followed atraumatically.  The infant was dried and suctioned.  The cord was doubly clamped and cut after 30 second delay.  The cord blood was sampled and/or banked.  The placenta delivered with gentle manual traction on the umbilical cord.  Placenta was noted to be intact with a normal 3 vessel cord.  The cervix and vagina were  inspected.  A second degree perineal laceration was noted.  The laceration was repaired with 3-0 vicryl in the usual fashion, after which excellent hemostasis was noted.  The fundus was again examined and noted to be firm and well contracted.  Bleeding was minimal.  The patient was then moved to the supine position in stable condition.  Counts were correct.    Jona Allen DO   6/16/2025  1:58 PM

## 2025-06-16 NOTE — PROGRESS NOTES
Comfortable with epidural, continues leaking clear fluid.  SVE 4-5/90/-1.  Continue pitocin as tolerated.

## 2025-06-16 NOTE — H&P
[facility]  History & Physical    Monica Nair Patient Status:  Inpatient    1995 MRN [id number]   Location [facility] LABOR & DELIVERY Attending Jona Allen, DO   Hosp Day # 0 PCP Joann Ace MD     Date of Admission:  2025    SUBJECTIVE:    Monica Nair  is a 29 year old  with Estimated Date of Delivery: 25 presenting at 40w5d for induction of labor due to pEDD. Some irregular contractions; denies vaginal bleeding, and leaking of fluid.  Feeling normal fetal movements.     Her current obstetrical history is significant for obesity, hx of SGA infant, Rh negative status.      Obstetric History:   OB History    Para Term  AB Living   2 1 1 0 0 1   SAB IAB Ectopic Multiple Live Births   0 0 0 0 1      # Outcome Date GA Lbr Jorden/2nd Weight Sex Type Anes PTL Lv   2 Current            1 Term 22 39w5d 05:30 / 01:13 5 lb 14 oz (2.665 kg) F NORMAL SPONT EPI N NANNETTE      Complications: Variable decelerations     Past Medical History: Past Medical History[1]  Past Social History: Past Surgical History[2]  Family History: Family History[3]  Social History:   Social History     Tobacco Use    Smoking status: Never    Smokeless tobacco: Never   Substance Use Topics    Alcohol use: Not Currently       Home Meds: Prescriptions Prior to Admission[4]  Allergies: Allergies[5]    OBJECTIVE:    Temp:  [98.9 °F (37.2 °C)] 98.9 °F (37.2 °C)  Pulse:  [93] 93  Resp:  [18] 18  BP: (134)/(86) 134/86    Lungs:   Normal effort, no respiratory distress   Heart:   Regular rate   Abdomen: Fundus soft between contractions   Fetal Surveillance: 145bpm, moderate variability, +accelerations, -decelerations      Cervix: 3-4cm / 80% / -2     Lab Review:  A, Rh -, Rubella-immune, Hepatitis B surface antigen non-reactive, GBS negative     ASSESSMENT/PLAN:  Monica Nair is a 29 year old  at 40w5d admitted for IOL due to pEDD    Obstetrical  history significant for obesity, hx of SGA infant, Rh negative status.  .  4/18 US EFW 45%tile    Admit to L&D  CBC, T&S, syphilis   GBS negative  Analgesics/epidural at patient request   AROM performed for clear fluid, good fetal head engagement   Start pitocin as tolerated    Jona Allen DO  6/16/2025  10:18 AM         [1] History reviewed. No pertinent past medical history.  [2]   Past Surgical History:  Procedure Laterality Date    Foot surgery      Remov sesamoid bone,1st toe Left 11/2017   [3]   Family History  Problem Relation Age of Onset    High Blood Pressure Father     High Blood Pressure Maternal Grandmother    [4]   Medications Prior to Admission   Medication Sig Dispense Refill Last Dose/Taking    calcium carbonate 500 MG Oral Chew Tab Chew 2 tablets (1,000 mg total) by mouth daily.   6/15/2025    Prenatal 6.75-0.2 MG Oral Tab Take by mouth.   6/15/2025   [5] No Known Allergies

## 2025-06-16 NOTE — ANESTHESIA PROCEDURE NOTES
Labor Analgesia    Date/Time: 6/16/2025 11:34 AM    Performed by: Bob Malave DO  Authorized by: Bob Malave DO      General Information and Staff    Start Time:  6/16/2025 11:34 AM  End Time:  6/16/2025 11:43 AM  Anesthesiologist:  Bob Malave DO  Performed by:  Anesthesiologist  Patient Location:  OB  Site Identification: surface landmarks    Reason for Block: labor epidural    Preanesthetic Checklist: patient identified, IV checked, risks and benefits discussed, monitors and equipment checked, pre-op evaluation, timeout performed, IV bolus, anesthesia consent and sterile technique used      Procedure Details    Patient Position:  Sitting  Prep: ChloraPrep    Monitoring:  Heart rate and continuous pulse ox  Approach:  Midline    Epidural Needle    Injection Technique:   Needle Type:  Tuohy  Needle Gauge:  17 G  Needle Length:  3.375 in  Needle Insertion Depth:  5  Location:  L3-4    Spinal Needle      Catheter    Catheter Type:  End hole  Catheter Size:  19 G  Catheter at Skin Depth:  10  Test Dose:  Negative    Assessment      Additional Comments       Patient in sitting position, assisted by RN; hands sterilized with alcohol gel, sterile gloves, mask, and hat worn, sterile prep (chloraprep) and drape applied, +SUDHA to sterile saline @5cm, epidural catheter threaded easily, and secured to patients' back, patient denied any paraesthesias, no tra blood or CSF aspirated; negative test dose; epidural dosed slowly with diluted local anesthetic without significant hypotension; no immediate complications observed. Patient tolerated procedure well.

## 2025-06-16 NOTE — ANESTHESIA PREPROCEDURE EVALUATION
PRE-OP EVALUATION    Patient Name: Monica Nair    Admit Diagnosis: Pregnancy (HCC)    Pre-op Diagnosis: * No surgery found *        Anesthesia Procedure: LABOR ANALGESIA    * Surgery not found *    Pre-op vitals reviewed.  Temp: 98.9 °F (37.2 °C)  Pulse: 60  Resp: 18  BP: 131/71  SpO2: 98 %  Body mass index is 39.8 kg/m².    Current medications reviewed.  Hospital Medications:  Current Medications[1]    Outpatient Medications:   Prescriptions Prior to Admission[2]    Allergies: Patient has no known allergies.      Anesthesia Evaluation    Patient summary reviewed.    Anesthetic Complications  (-) history of anesthetic complications         GI/Hepatic/Renal    Negative GI/hepatic/renal ROS.                             Cardiovascular    Negative cardiovascular ROS.  ECG reviewed.  Exercise tolerance: good     MET: >4    (+) obesity                                       Endo/Other    Negative endo/other ROS.                              Pulmonary    Negative pulmonary ROS.                       Neuro/Psych    Negative neuro/psych ROS.                                  Past Surgical History[3]  Social Hx on file[4]  History   Drug Use Unknown     Available pre-op labs reviewed.  Lab Results   Component Value Date    WBC 8.4 06/16/2025    RBC 4.27 06/16/2025    HGB 12.3 06/16/2025    HCT 37.1 06/16/2025    MCV 86.9 06/16/2025    MCH 28.8 06/16/2025    MCHC 33.2 06/16/2025    RDW 14.1 06/16/2025    .0 06/16/2025               Airway      Mallampati: I  Mouth opening: >3 FB  TM distance: 4 - 6 cm  Neck ROM: full Cardiovascular    Cardiovascular exam normal.  Rhythm: regular  Rate: normal  (-) murmur   Dental             Pulmonary    Pulmonary exam normal.  Breath sounds clear to auscultation bilaterally.               Other findings              ASA: 3   Plan: epidural  NPO status verified and patient meets guidelines.  Patient has not taken beta blockers in last 24 hours.  Post-procedure pain  management plan discussed with surgeon and patient.      Plan/risks discussed with: patient  Use of blood product(s) discussed with: patient    Consented to blood products.          Present on Admission:  **None**             [1]    lactated ringers infusion   Intravenous Continuous    dextrose in lactated ringers 5% infusion   Intravenous PRN    lactated ringers IV bolus 500 mL  500 mL Intravenous PRN    acetaminophen (Tylenol Extra Strength) tab 500 mg  500 mg Oral Q6H PRN    acetaminophen (Tylenol Extra Strength) tab 1,000 mg  1,000 mg Oral Q6H PRN    ibuprofen (Motrin) tab 600 mg  600 mg Oral Once PRN    ondansetron (Zofran) 4 MG/2ML injection 4 mg  4 mg Intravenous Q6H PRN    oxyTOCIN in sodium chloride 0.9% (Pitocin) 30 Units/500mL infusion premix  62.5-900 siomara-units/min Intravenous Continuous    terbutaline (Brethine) 1 MG/ML injection 0.25 mg  0.25 mg Subcutaneous PRN    sodium citrate-citric acid (Bicitra) 500-334 MG/5ML oral solution 30 mL  30 mL Oral PRN    ropivacaine (Naropin) 0.5% injection  30 mL Injection PRN    oxyTOCIN in sodium chloride 0.9% (Pitocin) 30 Units/500mL infusion premix  0.5-20 siomara-units/min Intravenous Continuous    lactated ringers IV bolus 1,000 mL  1,000 mL Intravenous Once    fentaNYL-bupivacaine 2 mcg/mL-0.125% in sodium chloride 0.9% 100 mL EPIDURAL infusion premix  12 mL/hr Epidural Continuous    fentaNYL (Sublimaze) 50 mcg/mL injection 100 mcg  100 mcg Epidural Once    lidocaine 1.5%-EPINEPHrine 1:200,000 (Xylocaine-Epinephrine) injection  5 mL Injection PRN    bupivacaine PF (Marcaine) 0.25% injection  30 mL Injection PRN    lidocaine PF (Xylocaine-MPF) 2% injection  5 mL Injection PRN    sodium chloride 0.9% PF injection 10 mL  10 mL Injection PRN    ePHEDrine (PF) 25 MG/5 ML injection 5 mg  5 mg Intravenous PRN    nalbuphine (Nubain) 10 mg/mL injection 2.5 mg  2.5 mg Intravenous Q15 Min PRN   [2]   Medications Prior to Admission   Medication Sig Dispense Refill Last  Dose/Taking    calcium carbonate 500 MG Oral Chew Tab Chew 2 tablets (1,000 mg total) by mouth daily.   6/15/2025    Prenatal 6.75-0.2 MG Oral Tab Take by mouth.   6/15/2025   [3]   Past Surgical History:  Procedure Laterality Date    Foot surgery      Remov sesamoid bone,1st toe Left 11/2017   [4]   Social History  Socioeconomic History    Marital status:    Tobacco Use    Smoking status: Never    Smokeless tobacco: Never   Vaping Use    Vaping status: Never Used   Substance and Sexual Activity    Alcohol use: Not Currently    Drug use: Never    Sexual activity: Yes     Partners: Male

## 2025-06-16 NOTE — PROGRESS NOTES
Pt is a 29 year old female admitted to 114/114-A.     Chief Complaint   Patient presents with    Scheduled Induction     0900 post dates IOL      Pt is  40w5d intra-uterine pregnancy.  History obtained, consents signed. Oriented to room, staff, and plan of care.

## 2025-06-16 NOTE — PLAN OF CARE
Problem: BIRTH - VAGINAL/ SECTION  Goal: Fetal and maternal status remain reassuring during the birth process  Description: INTERVENTIONS:  - Monitor vital signs  - Monitor fetal heart rate  - Monitor uterine activity  - Monitor labor progression (vaginal delivery)  - DVT prophylaxis (C/S delivery)  - Surgical antibiotic prophylaxis (C/S delivery)  2025 by Kristin Diamond RN  Outcome: Completed  2025 by Kristin Diamond RN  Outcome: Progressing     Problem: PAIN - ADULT  Goal: Verbalizes/displays adequate comfort level or patient's stated pain goal  Description: INTERVENTIONS:  - Encourage pt to monitor pain and request assistance  - Assess pain using appropriate pain scale  - Administer analgesics based on type and severity of pain and evaluate response  - Implement non-pharmacological measures as appropriate and evaluate response  - Consider cultural and social influences on pain and pain management  - Manage/alleviate anxiety  - Utilize distraction and/or relaxation techniques  - Monitor for opioid side effects  - Notify MD/LIP if interventions unsuccessful or patient reports new pain  - Anticipate increased pain with activity and pre-medicate as appropriate  2025 by Kristin Diamond RN  Outcome: Completed  2025 by Kristin Diamond RN  Outcome: Progressing     Problem: ANXIETY  Goal: Will report anxiety at manageable levels  Description: INTERVENTIONS:  - Administer medication as ordered  - Teach and rehearse alternative coping skills  - Provide emotional support with 1:1 interaction with staff  2025 by Kristin Diamond RN  Outcome: Completed  2025 by Kristin Diamond RN  Outcome: Progressing     Problem: Patient/Family Goals  Goal: Patient/Family Long Term Goal  Description: Patient's Long Term Goal: to have adequate pain management     Interventions:  -   - See additional Care Plan goals for specific interventions  2025 by Lobo  DIXIE Foster  Outcome: Completed  6/16/2025 0919 by Kristin Diamond RN  Outcome: Progressing  Goal: Patient/Family Short Term Goal  Description: Patient's Short Term Goal: to have an uncomplicated vaginal delivery   Interventions:   -   - See additional Care Plan goals for specific interventions  6/16/2025 1347 by Kristin Diamond RN  Outcome: Completed  6/16/2025 0919 by Kristin Diamond RN  Outcome: Progressing

## 2025-06-17 NOTE — PLAN OF CARE
Doing well, tylenol, tucks and dermoplast given for perineal pain  Rooming in with baby \"Humberto\"    Problem: POSTPARTUM  Goal: Long Term Goal:Experiences normal postpartum course  Description: INTERVENTIONS:  - Assess and monitor vital signs and lab values.  - Assess fundus and lochia.  - Provide ice/sitz baths for perineum discomfort.  - Monitor healing of laceration, and assess for signs and symptoms of infection and hematoma.  - Assess bladder function and monitor for bladder distention.  - Provide/instruct/assist with pericare as needed.  - Provide VTE prophylaxis as needed.  - Monitor bowel function.  - Encourage ambulation and provide assistance as needed.  - Assess and monitor emotional status and provide social service/psych resources as needed.  - Utilize standard precautions and use personal protective equipment as indicated. Ensure aseptic care of all intravenous lines and invasive tubes/drains.  - Obtain immunization and exposure to communicable diseases history.  Outcome: Progressing  Goal: Optimize infant feeding at the breast  Description: INTERVENTIONS:  - Monitor effectiveness of current breast feeding efforts.  - Assess support systems available to mother/family.  - Assess mother's knowledge and previous experience with breast feeding.  - Provide information as needed about early infant feeding cues (e.g., rooting, lip smacking, sucking fingers/hand) versus late cue of crying.  - Discuss/demonstrate breast feeding aids (e.g., nursing footstool/pillows, and breast pumps).  - Encourage mother/other family members to express feelings/concerns, and actively listen.  - Educate father/SO about benefits of breast feeding and how to manage common lactation challenges.  - Recommend avoidance of specific medications or substances incompatible with breast feeding.  - Assess and monitor for signs of nipple pain/trauma.  - Instruct and provide assistance with proper latch.  - Encourage rooming-in and breast  feeding on demand.  - Encourage skin-to-skin contact.  - Provide LC support as needed.  - Assess for and manage engorgement.  - Provide breast feeding education handouts and information on community breast feeding support.   Outcome: Progressing  Goal: Appropriate maternal -  bonding  Description: INTERVENTIONS:  - Assess caregiver- interactions.  - Assess caregiver's emotional status and coping mechanisms.  - Encourage caregiver to participate in  daily care.  - Assess support systems available to mother/family.  - Provide /case management support as needed.  Outcome: Progressing

## 2025-06-17 NOTE — PLAN OF CARE
Problem: POSTPARTUM  Goal: Long Term Goal:Experiences normal postpartum course  Description: INTERVENTIONS:  - Assess and monitor vital signs and lab values.  - Assess fundus and lochia.  - Provide ice/sitz baths for perineum discomfort.  - Monitor healing of laceration, and assess for signs and symptoms of infection and hematoma.  - Assess bladder function and monitor for bladder distention.  - Provide/instruct/assist with pericare as needed.  - Provide VTE prophylaxis as needed.  - Monitor bowel function.  - Encourage ambulation and provide assistance as needed.  - Assess and monitor emotional status and provide social service/psych resources as needed.  - Utilize standard precautions and use personal protective equipment as indicated. Ensure aseptic care of all intravenous lines and invasive tubes/drains.  - Obtain immunization and exposure to communicable diseases history.  Outcome: Completed  Goal: Optimize infant feeding at the breast  Description: INTERVENTIONS:  - Monitor effectiveness of current breast feeding efforts.  - Assess support systems available to mother/family.  - Assess mother's knowledge and previous experience with breast feeding.  - Provide information as needed about early infant feeding cues (e.g., rooting, lip smacking, sucking fingers/hand) versus late cue of crying.  - Discuss/demonstrate breast feeding aids (e.g., nursing footstool/pillows, and breast pumps).  - Encourage mother/other family members to express feelings/concerns, and actively listen.  - Educate father/SO about benefits of breast feeding and how to manage common lactation challenges.  - Recommend avoidance of specific medications or substances incompatible with breast feeding.  - Assess and monitor for signs of nipple pain/trauma.  - Instruct and provide assistance with proper latch.  - Review techniques for milk expression (breast pumping) and storage of breast milk. Provide pumping equipment/supplies, instructions and  assistance, as needed.  - Encourage rooming-in and breast feeding on demand.  - Encourage skin-to-skin contact.  - Provide LC support as needed.  - Assess for and manage engorgement.  - Provide breast feeding education handouts and information on community breast feeding support.   Outcome: Completed  Goal: Establishment of adequate milk supply with medication/procedure interruptions  Description: INTERVENTIONS:  - Review techniques for milk expression (breast pumping).   - Provide pumping equipment/supplies, instructions, and assistance as needed  Outcome: Completed  Goal: Appropriate maternal -  bonding  Description: INTERVENTIONS:  - Assess caregiver- interactions.  - Assess caregiver's emotional status and coping mechanisms.  - Encourage caregiver to participate in  daily care.  - Assess support systems available to mother/family.  - Provide /case management support as needed.  Outcome: Completed

## 2025-06-17 NOTE — PROGRESS NOTES
Labor Analgesia Follow Up Note    Patient underwent epidural anesthesia for labor analgesia,    Placenta Date/Time: 6/16/2025  1:40 PM    Delivery Date/Time:: 6/16/2025  1:36 PM    /67 (BP Location: Right arm)   Pulse 74   Temp 98.1 °F (36.7 °C) (Oral)   Resp 16   Wt 105.2 kg (232 lb)   LMP 09/07/2024   SpO2 98%   Breastfeeding Yes   BMI 39.80 kg/m²     Assessment:  Patient seen and no apparent anesthesia related complications.    Thank you for asking us to participate in the care of your patient.

## 2025-06-17 NOTE — PROGRESS NOTES
Postpartum Progress Note    SUBJECTIVE:  Postpartum day #1 s/p     No overnight events.  Patient doing well without complaints.  Ambulating, tolerating PO, voiding, pain well controlled.      OBJECTIVE:    Vital signs in last 24 hours:  Temp:  [97.7 °F (36.5 °C)-98.9 °F (37.2 °C)] 98.1 °F (36.7 °C)  Pulse:  [] 74  Resp:  [16-18] 16  BP: (111-143)/(56-92) 134/67  SpO2:  [98 %] 98 %  Input/Output:  [unfilled]    Gen: appears well, nad  Abdomin: nontender, fundus firm below umbilicus  Lochia:    Minimal to moderate  Extremity: nontender    ASSESSMENT/PLAN:  Postpartum Day #2 s/p   Recovering well  Continue present management  Desires discharge home today pending  - instructions reviewed, follow up in 6 weeks for postpartum visit    Danae Cobian MD  2025  9:01 AM